# Patient Record
Sex: MALE | Race: WHITE | NOT HISPANIC OR LATINO | URBAN - METROPOLITAN AREA
[De-identification: names, ages, dates, MRNs, and addresses within clinical notes are randomized per-mention and may not be internally consistent; named-entity substitution may affect disease eponyms.]

---

## 2020-07-25 ENCOUNTER — TELEPHONE ENCOUNTER (OUTPATIENT)
Dept: URBAN - METROPOLITAN AREA CLINIC 13 | Facility: CLINIC | Age: 65
End: 2020-07-25

## 2020-07-25 RX ORDER — GABAPENTIN 300 MG/1
CAPSULE ORAL
Refills: 0 | OUTPATIENT
Start: 2019-04-29 | End: 2019-08-21

## 2020-07-25 RX ORDER — HYDROXYCHLOROQUINE SULFATE 200 MG/1
TABLET ORAL
Refills: 0 | OUTPATIENT
End: 2019-10-11

## 2020-07-25 RX ORDER — METHYLPREDNISOLONE 4 MG/1
TABLET ORAL
Qty: 21 | Refills: 0 | OUTPATIENT
Start: 2019-05-14 | End: 2019-08-21

## 2020-07-25 RX ORDER — POLYETHYLENE GLYCOL 3350, SODIUM CHLORIDE, SODIUM BICARBONATE AND POTASSIUM CHLORIDE WITH LEMON FLAVOR 420; 11.2; 5.72; 1.48 G/4L; G/4L; G/4L; G/4L
TAKE AS DIRECTED POWDER, FOR SOLUTION ORAL
Qty: 1 | Refills: 0 | OUTPATIENT
Start: 2019-08-21 | End: 2019-10-11

## 2020-07-25 RX ORDER — POLYETHYLENE GLYCOL 3350, SODIUM CHLORIDE, SODIUM BICARBONATE AND POTASSIUM CHLORIDE WITH LEMON FLAVOR 420; 11.2; 5.72; 1.48 G/4L; G/4L; G/4L; G/4L
TAKE AS DIRECTED POWDER, FOR SOLUTION ORAL
Qty: 1 | Refills: 0 | OUTPATIENT
Start: 2019-08-21 | End: 2019-08-21

## 2020-07-25 RX ORDER — MONTELUKAST SODIUM 10 MG/1
TAKE 1 TABLET DAILY TABLET, FILM COATED ORAL
Refills: 0 | OUTPATIENT
End: 2019-11-11

## 2020-07-26 ENCOUNTER — TELEPHONE ENCOUNTER (OUTPATIENT)
Dept: URBAN - METROPOLITAN AREA CLINIC 13 | Facility: CLINIC | Age: 65
End: 2020-07-26

## 2020-07-26 RX ORDER — PANTOPRAZOLE 20 MG/1
TABLET, DELAYED RELEASE ORAL
Qty: 90 | Refills: 0 | Status: ACTIVE | COMMUNITY
Start: 2019-05-24

## 2020-07-26 RX ORDER — MONTELUKAST SODIUM 10 MG/1
TABLET, FILM COATED ORAL
Qty: 30 | Refills: 0 | Status: ACTIVE | COMMUNITY
Start: 2019-09-19

## 2020-07-26 RX ORDER — METHSCOPOLAMINE BROMIDE 2.5 MG/1
TABLET ORAL
Qty: 60 | Refills: 0 | Status: ACTIVE | COMMUNITY
Start: 2019-11-04

## 2020-07-26 RX ORDER — HYDROXYCHLOROQUINE SULFATE 200 MG/1
TAKE 2 TABLETS DAILY WITH FOOD TABLET, FILM COATED ORAL
Refills: 0 | Status: ACTIVE | COMMUNITY
Start: 2019-04-11

## 2020-07-26 RX ORDER — OXYCODONE AND ACETAMINOPHEN 5; 325 MG/1; MG/1
TABLET ORAL
Qty: 20 | Refills: 0 | Status: ACTIVE | COMMUNITY
Start: 2019-05-08

## 2020-07-26 RX ORDER — PANTOPRAZOLE SODIUM 40 MG/1
TAKE 1 TABLET BY MOUTH EVERY DAY TABLET, DELAYED RELEASE ORAL
Qty: 30 | Refills: 7 | Status: ACTIVE | COMMUNITY
Start: 2019-08-21

## 2020-07-26 RX ORDER — METHOTREXATE 25 MG/ML
INJECTION INTRA-ARTERIAL; INTRAMUSCULAR; INTRATHECAL; INTRAVENOUS
Qty: 2 | Refills: 0 | Status: ACTIVE | COMMUNITY
Start: 2019-04-17

## 2020-07-26 RX ORDER — TRAMADOL HYDROCHLORIDE 50 MG/1
TAKE 1 TABLET BY MOUTH EVERY 8 HOURS AS NEEDED FOR PAIN TABLET ORAL
Qty: 60 | Refills: 0 | Status: ACTIVE | COMMUNITY
Start: 2018-11-12

## 2020-07-26 RX ORDER — PANTOPRAZOLE 20 MG/1
TABLET, DELAYED RELEASE ORAL
Qty: 90 | Refills: 0 | Status: ACTIVE | COMMUNITY
Start: 2019-02-26

## 2020-07-26 RX ORDER — METHOTREXATE 2.5 MG/1
TAKE 1 TABLET WEEKLY INJECTION INTO ABDOMEN AT HOME AS INSTRUCTED TABLET ORAL
Refills: 0 | Status: ACTIVE | COMMUNITY

## 2020-07-26 RX ORDER — METHOTREXATE 25 MG/ML
1 ML ONCE WEEKLY INJECTION INJECTION INTRA-ARTERIAL; INTRAMUSCULAR; INTRATHECAL; INTRAVENOUS
Qty: 12 | Refills: 0 | Status: ACTIVE | COMMUNITY
Start: 2019-09-10

## 2020-07-26 RX ORDER — METHOTREXATE 25 MG/ML
INJECTION INTRA-ARTERIAL; INTRAMUSCULAR; INTRATHECAL; INTRAVENOUS
Qty: 12 | Refills: 0 | Status: ACTIVE | COMMUNITY
Start: 2019-08-02

## 2020-07-26 RX ORDER — TIZANIDINE 2 MG/1
TAKE 1 TABLET BY MOUTH EVERY 8HOURS AS NEEDED FOR BACK PAIN TABLET ORAL
Qty: 30 | Refills: 0 | Status: ACTIVE | COMMUNITY
Start: 2018-11-12

## 2020-10-29 ENCOUNTER — ERX REFILL RESPONSE (OUTPATIENT)
Age: 65
End: 2020-10-29

## 2020-10-29 RX ORDER — PANTOPRAZOLE SODIUM 40 MG/1
TAKE 1 TABLET BY MOUTH EVERY DAY TABLET, DELAYED RELEASE ORAL
Qty: 30 | Refills: 1

## 2020-10-30 ENCOUNTER — ERX REFILL RESPONSE (OUTPATIENT)
Age: 65
End: 2020-10-30

## 2020-10-30 RX ORDER — PANTOPRAZOLE SODIUM 40 MG/1
TAKE 1 TABLET BY MOUTH EVERY DAY TABLET, DELAYED RELEASE ORAL ONCE A DAY
Qty: 30 | Refills: 2

## 2021-03-07 ENCOUNTER — ERX REFILL RESPONSE (OUTPATIENT)
Dept: URBAN - METROPOLITAN AREA CLINIC 113 | Facility: CLINIC | Age: 66
End: 2021-03-07

## 2021-03-07 RX ORDER — PANTOPRAZOLE SODIUM 40 MG/1
TAKE 1 TABLET BY MOUTH EVERY DAY TABLET, DELAYED RELEASE ORAL ONCE A DAY
Qty: 30 | Refills: 2

## 2021-04-20 ENCOUNTER — OFFICE VISIT (OUTPATIENT)
Dept: URBAN - METROPOLITAN AREA CLINIC 113 | Facility: CLINIC | Age: 66
End: 2021-04-20
Payer: MEDICARE

## 2021-04-20 ENCOUNTER — WEB ENCOUNTER (OUTPATIENT)
Dept: URBAN - METROPOLITAN AREA CLINIC 113 | Facility: CLINIC | Age: 66
End: 2021-04-20

## 2021-04-20 VITALS
HEIGHT: 72 IN | WEIGHT: 290 LBS | TEMPERATURE: 97.7 F | DIASTOLIC BLOOD PRESSURE: 91 MMHG | BODY MASS INDEX: 39.28 KG/M2 | SYSTOLIC BLOOD PRESSURE: 160 MMHG | HEART RATE: 57 BPM | RESPIRATION RATE: 18 BRPM

## 2021-04-20 DIAGNOSIS — K59.03 DRUG-INDUCED CONSTIPATION: ICD-10-CM

## 2021-04-20 DIAGNOSIS — K21.9 GASTROESOPHAGEAL REFLUX DISEASE WITHOUT ESOPHAGITIS: ICD-10-CM

## 2021-04-20 DIAGNOSIS — R19.8 IRREGULAR BOWEL HABITS: ICD-10-CM

## 2021-04-20 PROCEDURE — 99214 OFFICE O/P EST MOD 30 MIN: CPT | Performed by: INTERNAL MEDICINE

## 2021-04-20 RX ORDER — PANTOPRAZOLE SODIUM 40 MG/1
TAKE 1 TABLET BY MOUTH EVERY DAY TABLET, DELAYED RELEASE ORAL ONCE A DAY
Qty: 30 | Refills: 2 | Status: ACTIVE | COMMUNITY

## 2021-04-20 RX ORDER — METHOTREXATE 25 MG/ML
INJECTION INTRA-ARTERIAL; INTRAMUSCULAR; INTRATHECAL; INTRAVENOUS
Qty: 2 | Refills: 0 | Status: ACTIVE | COMMUNITY
Start: 2019-04-17

## 2021-04-20 RX ORDER — METHSCOPOLAMINE BROMIDE 2.5 MG/1
1 TABLET 30 MINUTES BEFORE MEALS AND AT BEDTIME TABLET ORAL
Refills: 0 | Status: DISCONTINUED | COMMUNITY
Start: 2019-11-04

## 2021-04-20 RX ORDER — CELECOXIB 200 MG/1
1 CAPSULE WITH FOOD CAPSULE ORAL ONCE A DAY
Status: ACTIVE | COMMUNITY

## 2021-04-20 RX ORDER — METHYLNALTREXONE BROMIDE 150 MG/1
3 TABLETS 30 MINUTES BEFORE THE FIRST MEAL OF THE DAY TABLET ORAL ONCE A DAY
Qty: 90 | Refills: 3 | OUTPATIENT
Start: 2021-04-21 | End: 2021-08-19

## 2021-04-20 RX ORDER — MONTELUKAST SODIUM 10 MG/1
1 TABLET TABLET, FILM COATED ORAL ONCE A DAY
Refills: 0 | Status: DISCONTINUED | COMMUNITY
Start: 2019-09-19

## 2021-04-20 RX ORDER — OXYCODONE AND ACETAMINOPHEN 5; 325 MG/1; MG/1
1 TABLET AS NEEDED TABLET ORAL
Refills: 0 | Status: DISCONTINUED | COMMUNITY
Start: 2019-05-08

## 2021-04-20 RX ORDER — METHOTREXATE 2.5 MG/1
TAKE 1 TABLET WEEKLY INJECTION INTO ABDOMEN AT HOME AS INSTRUCTED TABLET ORAL
Refills: 0 | Status: DISCONTINUED | COMMUNITY

## 2021-04-20 RX ORDER — PANTOPRAZOLE 20 MG/1
TABLET, DELAYED RELEASE ORAL
Qty: 90 | Refills: 0 | Status: DISCONTINUED | COMMUNITY
Start: 2019-02-26

## 2021-04-20 RX ORDER — TRAMADOL HYDROCHLORIDE 50 MG/1
TAKE 1 TABLET BY MOUTH EVERY 8 HOURS AS NEEDED FOR PAIN TABLET ORAL
Qty: 60 | Refills: 0 | Status: ACTIVE | COMMUNITY
Start: 2018-11-12

## 2021-04-20 RX ORDER — TIZANIDINE 2 MG/1
TAKE 1 TABLET BY MOUTH EVERY 8HOURS AS NEEDED FOR BACK PAIN TABLET ORAL
Qty: 30 | Refills: 0 | Status: DISCONTINUED | COMMUNITY
Start: 2018-11-12

## 2021-04-20 RX ORDER — HYDROXYCHLOROQUINE SULFATE 200 MG/1
TAKE 2 TABLETS DAILY WITH FOOD TABLET, FILM COATED ORAL
Refills: 0 | Status: ACTIVE | COMMUNITY
Start: 2019-04-11

## 2021-04-20 RX ORDER — PLECANATIDE 3 MG/1
1 TABLET TABLET ORAL ONCE A DAY
Qty: 30 | Refills: 3 | OUTPATIENT
Start: 2021-04-21 | End: 2021-08-19

## 2021-04-20 NOTE — HPI-TODAY'S VISIT:
Mr. Dorantes is a 65-year-old male with a history of rheumatoid arthritis on Actonel and methotrexate, back pain, obesity, and GERD presenting for follow-up for complaints of constipation and heartburn. He was last seen for these complaints on November 11, 2019. At that time, he was being followed up for complaints of belching, heartburn, regurgitation, and worsening constipation. He also had symptoms of hoarseness despite taking pantoprazole 40 mg daily. He did admit to a postnasal drip. The patient's pantoprazole was increased to 40 mg daily, and he was placed on Metamucil and MiraLAX one capful daily. The patient was also scheduled for upper endoscopy and colonoscopy. Upper endoscopy was never performed. The patient states that his reflux symptoms improved to the point that he did not feel it was necessary. Colonoscopy was performed on October 11, 2019. He was found to have a fair bowel preparation, left colon diverticulosis, 2 transverse colon polyps, and nonbleeding internal hemorrhoids. The polyp were both tubular adenomas.  At the time of his last visit, he was doing well. Reflux had largely resolved. Bowel habits were normal, with no rectal bleeding, constipation or diarrhea.   The patient's reflux symptoms remain controlled on pantoprazole 40 mg daily.  He is taking Metamucil and MiraLAX for his constipation, but he is continuing to have problems with alternating bowel habits.  He is having a bowel movement every 3 days or so, with passage of a hard stool followed by the passage of multiple increasingly loose stools.  He then will have no bowel movement for 3 days and the cycle will repeat itself.  He is eating a high-fiber diet, and is straining a lot.  He is concerned because he is having problems with abdominal wall hernias, and feels that he cannot have these repaired until he eliminates the straining problem with defecation.  Of note, the patient is taking acetaminophen/oxycodone every 6 hours for complaints of back pain.  This may be contributing to his current symptomatology.  He admits that he is not using fiber as regularly as he should.  He is only taking it sporadically.  He is not using MiraLAX at this time.  Notably, the patient denies rectal bleeding, weight loss, etc.

## 2021-04-20 NOTE — EXAM-FUNCTIONAL ASSESSMENT
General--no acute distress Eyes--anicteric HENT--normocephalic, atraumatic head Neck--no lymphadenopathy Chest--normal breath sounds Heart--regular rate and rhythm Abdomen--soft, non tender, Softly distended, bowel sounds present. Reducible umbilical hernia noted. Musculoskeletal--Somewhat lurching gait and station Skin--no rashes Neurologic--Alert and oriented x 3 Psychiatric--stable mood, appropriate affect

## 2021-04-21 PROBLEM — 266435005: Status: ACTIVE | Noted: 2021-04-21

## 2021-04-22 ENCOUNTER — ERX REFILL RESPONSE (OUTPATIENT)
Dept: URBAN - METROPOLITAN AREA CLINIC 113 | Facility: CLINIC | Age: 66
End: 2021-04-22

## 2021-04-22 RX ORDER — METHYLNALTREXONE BROMIDE 150 MG/1
3 TABLETS 30 MINUTES BEFORE THE FIRST MEAL OF THE DAY TABLET ORAL ONCE A DAY
Qty: 90 | Refills: 3 | OUTPATIENT

## 2021-04-22 RX ORDER — NALOXEGOL OXALATE 25 MG/1
PLEASE SPECIFY DIRECTIONS, REFILLS AND QUANTITY TABLET, FILM COATED ORAL
Qty: 1 TABLET | Refills: 0 | OUTPATIENT

## 2021-08-16 ENCOUNTER — OFFICE VISIT (OUTPATIENT)
Dept: URBAN - METROPOLITAN AREA CLINIC 113 | Facility: CLINIC | Age: 66
End: 2021-08-16

## 2021-08-21 ENCOUNTER — ERX REFILL RESPONSE (OUTPATIENT)
Dept: URBAN - METROPOLITAN AREA CLINIC 113 | Facility: CLINIC | Age: 66
End: 2021-08-21

## 2021-08-21 RX ORDER — PANTOPRAZOLE SODIUM 40 MG/1
TAKE 1 TABLET BY MOUTH EVERY DAY TABLET, DELAYED RELEASE ORAL
Qty: 30 TABLET | Refills: 3 | OUTPATIENT

## 2021-08-21 RX ORDER — PANTOPRAZOLE SODIUM 40 MG/1
TAKE 1 TABLET BY MOUTH EVERY DAY TABLET, DELAYED RELEASE ORAL ONCE A DAY
Qty: 30 | Refills: 2 | OUTPATIENT

## 2024-04-17 ENCOUNTER — LAB (OUTPATIENT)
Dept: URBAN - METROPOLITAN AREA CLINIC 113 | Facility: CLINIC | Age: 69
End: 2024-04-17

## 2024-04-17 ENCOUNTER — OV NP (OUTPATIENT)
Dept: URBAN - METROPOLITAN AREA CLINIC 113 | Facility: CLINIC | Age: 69
End: 2024-04-17
Payer: MEDICARE

## 2024-04-17 VITALS
WEIGHT: 268 LBS | SYSTOLIC BLOOD PRESSURE: 138 MMHG | DIASTOLIC BLOOD PRESSURE: 80 MMHG | BODY MASS INDEX: 36.3 KG/M2 | HEART RATE: 57 BPM | TEMPERATURE: 97.7 F | HEIGHT: 72 IN | RESPIRATION RATE: 20 BRPM

## 2024-04-17 DIAGNOSIS — K59.03 DRUG-INDUCED CONSTIPATION: ICD-10-CM

## 2024-04-17 DIAGNOSIS — K21.9 GERD WITHOUT ESOPHAGITIS: ICD-10-CM

## 2024-04-17 DIAGNOSIS — Z86.010 HISTORY OF ADENOMATOUS POLYP OF COLON: ICD-10-CM

## 2024-04-17 PROBLEM — 266435005: Status: ACTIVE | Noted: 2024-04-17

## 2024-04-17 PROCEDURE — 99214 OFFICE O/P EST MOD 30 MIN: CPT | Performed by: NURSE PRACTITIONER

## 2024-04-17 RX ORDER — HYDROXYCHLOROQUINE SULFATE 200 MG/1
TAKE 2 TABLETS DAILY WITH FOOD TABLET, FILM COATED ORAL
Refills: 0 | Status: ACTIVE | COMMUNITY
Start: 2019-04-11

## 2024-04-17 RX ORDER — NALOXEGOL OXALATE 25 MG/1
PLEASE SPECIFY DIRECTIONS, REFILLS AND QUANTITY TABLET, FILM COATED ORAL
Qty: 1 TABLET | Refills: 0 | Status: ON HOLD | COMMUNITY

## 2024-04-17 RX ORDER — PANTOPRAZOLE SODIUM 40 MG/1
TAKE 1 TABLET BY MOUTH EVERY DAY TABLET, DELAYED RELEASE ORAL
Qty: 30 TABLET | Refills: 3 | Status: ACTIVE | COMMUNITY

## 2024-04-17 RX ORDER — CELECOXIB 200 MG/1
1 CAPSULE WITH FOOD CAPSULE ORAL ONCE A DAY
Status: ON HOLD | COMMUNITY

## 2024-04-17 RX ORDER — PANTOPRAZOLE SODIUM 40 MG/1
1 TABLET TABLET, DELAYED RELEASE ORAL TWICE DAILY
Qty: 60 | Refills: 3 | OUTPATIENT
Start: 2024-04-17

## 2024-04-17 RX ORDER — METHOTREXATE 25 MG/ML
INJECTION INTRA-ARTERIAL; INTRAMUSCULAR; INTRATHECAL; INTRAVENOUS
Qty: 2 | Refills: 0 | Status: ACTIVE | COMMUNITY
Start: 2019-04-17

## 2024-04-17 RX ORDER — TRAMADOL HYDROCHLORIDE 50 MG/1
TAKE 1 TABLET BY MOUTH EVERY 8 HOURS AS NEEDED FOR PAIN TABLET ORAL
Qty: 60 | Refills: 0 | Status: ACTIVE | COMMUNITY
Start: 2018-11-12

## 2024-04-17 NOTE — HPI-TODAY'S VISIT:
This is is a 68-year-old male with a history of rheumatoid arthritis on Actonel and methotrexate, chronic back pain, irregular bowel habits/drug-induced constipation (opioids), GERD, and adenomatous colon polyps due surveillance in October 2024 presenting for follow-up.  He was last seen in the office 4/20/2021.  He had persistent irregular bowel habits affecting his quality of life.  He had been inconsistently using fiber supplements and osmotic laxatives.  He was encouraged to take these on a regular basis.  He was prescribed a trial of Relistor and Trulance.  He was to begin Relistor and use Trulance if Relistor was ineffective.  He did not return for follow-up.  In November, he had some exacerbations of back pain.  Except for trips to the bathroom, he was immobile for 2 months.  He had a subsequent MRI that discovered a left renal cancer.  He underwent left nephrectomy.  He reports increase in size of abdominal hernia postoperatively.  He had postoperative regurgitation following renal surgery.  He reports burning indicated in the epigastrium and chest with belching associated with a "chemical odor."  He has experienced burning from the umbilicus into his epigastrium and underneath his right and left lower anterior ribs.  He reports a raspy throat.  He feels as though his esophagus is restricted associated with episodes of reflux.  He reports a sensation of fullness in his esophagus and food "moves a little slower."  He denies food becoming lodged. He has persistent constipation.  Insurance would not cover Relistor and Trulance had a $200 co-pay.  He is currently "sipping" mineral oil twice a day as needed.  He currently has 2 days without a bowel movement, strains for stool, then has normal bowel movements and then the cycle will repeat itself.  He is "okay" with his current bowel habits.  He denies NSAID use.

## 2024-04-19 ENCOUNTER — EGD (OUTPATIENT)
Dept: URBAN - METROPOLITAN AREA SURGERY CENTER 25 | Facility: SURGERY CENTER | Age: 69
End: 2024-04-19
Payer: MEDICARE

## 2024-04-19 ENCOUNTER — LAB (OUTPATIENT)
Dept: URBAN - METROPOLITAN AREA CLINIC 4 | Facility: CLINIC | Age: 69
End: 2024-04-19
Payer: MEDICARE

## 2024-04-19 DIAGNOSIS — K22.89 OTHER SPECIFIED DISEASE OF ESOPHAGUS: ICD-10-CM

## 2024-04-19 DIAGNOSIS — R10.13 DYSPEPSIA: ICD-10-CM

## 2024-04-19 DIAGNOSIS — K31.7 BENIGN GASTRIC POLYP: ICD-10-CM

## 2024-04-19 DIAGNOSIS — K31.7 POLYP OF STOMACH AND DUODENUM: ICD-10-CM

## 2024-04-19 DIAGNOSIS — K21.9 GASTRO-ESOPHAGEAL REFLUX DISEASE WITHOUT ESOPHAGITIS: ICD-10-CM

## 2024-04-19 PROCEDURE — 43239 EGD BIOPSY SINGLE/MULTIPLE: CPT | Performed by: INTERNAL MEDICINE

## 2024-04-19 PROCEDURE — 88312 SPECIAL STAINS GROUP 1: CPT | Performed by: PATHOLOGY

## 2024-04-19 PROCEDURE — 88305 TISSUE EXAM BY PATHOLOGIST: CPT | Performed by: PATHOLOGY

## 2024-04-19 RX ORDER — PANTOPRAZOLE SODIUM 40 MG/1
1 TABLET TABLET, DELAYED RELEASE ORAL TWICE DAILY
Qty: 60 | Refills: 3 | Status: ACTIVE | COMMUNITY
Start: 2024-04-17

## 2024-04-19 RX ORDER — PANTOPRAZOLE SODIUM 40 MG/1
TAKE 1 TABLET BY MOUTH EVERY DAY TABLET, DELAYED RELEASE ORAL
Qty: 30 TABLET | Refills: 3 | Status: ACTIVE | COMMUNITY

## 2024-04-19 RX ORDER — METHOTREXATE 25 MG/ML
INJECTION INTRA-ARTERIAL; INTRAMUSCULAR; INTRATHECAL; INTRAVENOUS
Qty: 2 | Refills: 0 | Status: ACTIVE | COMMUNITY
Start: 2019-04-17

## 2024-04-19 RX ORDER — NALOXEGOL OXALATE 25 MG/1
PLEASE SPECIFY DIRECTIONS, REFILLS AND QUANTITY TABLET, FILM COATED ORAL
Qty: 1 TABLET | Refills: 0 | Status: ON HOLD | COMMUNITY

## 2024-04-19 RX ORDER — HYDROXYCHLOROQUINE SULFATE 200 MG/1
TAKE 2 TABLETS DAILY WITH FOOD TABLET, FILM COATED ORAL
Refills: 0 | Status: ACTIVE | COMMUNITY
Start: 2019-04-11

## 2024-04-19 RX ORDER — TRAMADOL HYDROCHLORIDE 50 MG/1
TAKE 1 TABLET BY MOUTH EVERY 8 HOURS AS NEEDED FOR PAIN TABLET ORAL
Qty: 60 | Refills: 0 | Status: ACTIVE | COMMUNITY
Start: 2018-11-12

## 2024-04-19 RX ORDER — CELECOXIB 200 MG/1
1 CAPSULE WITH FOOD CAPSULE ORAL ONCE A DAY
Status: ON HOLD | COMMUNITY

## 2024-04-20 PROBLEM — 429047008: Status: ACTIVE | Noted: 2024-04-20

## 2024-07-18 ENCOUNTER — OFFICE VISIT (OUTPATIENT)
Dept: URBAN - METROPOLITAN AREA CLINIC 113 | Facility: CLINIC | Age: 69
End: 2024-07-18
Payer: MEDICARE

## 2024-07-18 ENCOUNTER — LAB OUTSIDE AN ENCOUNTER (OUTPATIENT)
Dept: URBAN - METROPOLITAN AREA CLINIC 113 | Facility: CLINIC | Age: 69
End: 2024-07-18

## 2024-07-18 ENCOUNTER — DASHBOARD ENCOUNTERS (OUTPATIENT)
Age: 69
End: 2024-07-18

## 2024-07-18 VITALS
RESPIRATION RATE: 20 BRPM | TEMPERATURE: 97.5 F | HEIGHT: 72 IN | SYSTOLIC BLOOD PRESSURE: 138 MMHG | WEIGHT: 282 LBS | DIASTOLIC BLOOD PRESSURE: 84 MMHG | BODY MASS INDEX: 38.19 KG/M2 | HEART RATE: 59 BPM

## 2024-07-18 DIAGNOSIS — Z86.010 HISTORY OF ADENOMATOUS POLYP OF COLON: ICD-10-CM

## 2024-07-18 DIAGNOSIS — K21.9 GERD WITHOUT ESOPHAGITIS: ICD-10-CM

## 2024-07-18 DIAGNOSIS — R14.3 EXCESSIVE GAS: ICD-10-CM

## 2024-07-18 DIAGNOSIS — R19.4 ALTERATION IN BOWEL ELIMINATION: ICD-10-CM

## 2024-07-18 PROBLEM — 80301007: Status: ACTIVE | Noted: 2024-07-18

## 2024-07-18 PROCEDURE — 99214 OFFICE O/P EST MOD 30 MIN: CPT | Performed by: NURSE PRACTITIONER

## 2024-07-18 RX ORDER — NALOXEGOL OXALATE 25 MG/1
PLEASE SPECIFY DIRECTIONS, REFILLS AND QUANTITY TABLET, FILM COATED ORAL
Qty: 1 TABLET | Refills: 0 | Status: ON HOLD | COMMUNITY

## 2024-07-18 RX ORDER — PANTOPRAZOLE SODIUM 40 MG/1
1 TABLET TABLET, DELAYED RELEASE ORAL TWICE DAILY
Qty: 60 | Refills: 3 | Status: ACTIVE | COMMUNITY
Start: 2024-04-17

## 2024-07-18 RX ORDER — METHOTREXATE 25 MG/ML
? STRENGTH; 1ML INJECTION, SOLUTION INTRA-ARTERIAL; INTRAMUSCULAR; INTRAVENOUS WEEKLY
Status: ACTIVE | COMMUNITY

## 2024-07-18 RX ORDER — PANTOPRAZOLE SODIUM 40 MG/1
1 TABLET TABLET, DELAYED RELEASE ORAL TWICE DAILY
Qty: 180 | Refills: 3 | Status: ON HOLD | COMMUNITY

## 2024-07-18 RX ORDER — FOLIC ACID 1 MG/1
1 TABLET TABLET ORAL ONCE A DAY
Status: ACTIVE | COMMUNITY

## 2024-07-18 RX ORDER — POLYETHYLENE GLYCOL 3350, SODIUM CHLORIDE, SODIUM BICARBONATE, POTASSIUM CHLORIDE 420; 11.2; 5.72; 1.48 G/4L; G/4L; G/4L; G/4L
AS DIRECTED POWDER, FOR SOLUTION ORAL
Qty: 1 BOTTLE | Refills: 0 | OUTPATIENT
Start: 2024-07-18 | End: 2024-07-19

## 2024-07-18 RX ORDER — HYDROXYCHLOROQUINE SULFATE 200 MG/1
TAKE 2 TABLETS DAILY WITH FOOD TABLET, FILM COATED ORAL
Refills: 0 | Status: ACTIVE | COMMUNITY
Start: 2019-04-11

## 2024-07-18 RX ORDER — CELECOXIB 200 MG/1
1 CAPSULE WITH FOOD CAPSULE ORAL ONCE A DAY
Status: ON HOLD | COMMUNITY

## 2024-07-18 RX ORDER — TRAMADOL HYDROCHLORIDE 50 MG/1
TAKE 1 TABLET BY MOUTH EVERY 8 HOURS AS NEEDED FOR PAIN TABLET ORAL
Qty: 60 | Refills: 0 | Status: ACTIVE | COMMUNITY
Start: 2018-11-12

## 2024-07-18 RX ORDER — METRONIDAZOLE 500 MG/1
1 TABLET TABLET ORAL THREE TIMES A DAY
Qty: 30 TABLET | Refills: 0 | OUTPATIENT
Start: 2024-07-18 | End: 2024-07-28

## 2024-07-18 RX ORDER — ESOMEPRAZOLE MAGNESIUM 40 MG/1
1 CAPSULE CAPSULE, DELAYED RELEASE PELLETS ORAL ONCE A DAY
Qty: 30 CAPSULE | Refills: 11 | OUTPATIENT
Start: 2024-07-18

## 2024-07-18 NOTE — HPI-OTHER HISTORIES
EGD 4/19/2024: Irregular Z-line status post biopsy, multiple gastric polyps resected and retrieved, normal duodenum. Pathology: Stomach body biopsies demonstrated fundic gland polyps without H. pylori or intestinal metaplasia. Distal esophageal biopsies demonstrated gastric mucosa without significant abnormality; no squamous mucosa identified.

## 2024-07-18 NOTE — HPI-TODAY'S VISIT:
This is a 68-year-old male with a history of rheumatoid arthritis on Actonel and methotrexate, chronic back pain, irregular bowel habits/drug-induced constipation (opioids), GERD, and adenomatous colon polyps due surveillance in October 2024 presenting for follow-up.  He was last seen in the office 4/17/2024 reporting worsening acid reflux on pantoprazole 40 mg daily.  He was to increase to twice a day and was scheduled for an EGD.  He was managing constipation with mineral oil twice a day and was to continue this.  EGD 4/19/2024: Irregular Z-line status post biopsy, multiple gastric polyps resected and retrieved, normal duodenum. Pathology: Stomach body biopsies demonstrated fundic gland polyps without H. pylori or intestinal metaplasia.  Distal esophageal biopsies demonstrated gastric mucosa without significant abnormality; no squamous mucosa identified.  He is taking pantoprazole 40 mg twice a day.  He continues to have indigestion described as heartburn and regurgitation.  This may occur after eating or 2 or 3 hours later.  He denies dysphagia.  He took Nexium in the past and reports it was immediately effective.  He has constant pain in the area of an umbilical hernia.  He is scheduled for a hernia repair with Dr. Izquierdo next month.  He also has chronic pain in the area of prior renal surgery.  His bowel habits are stable on a Metamucil wafer daily and mineral oil twice a day.  He will have 1 or 2 days without a bowel movement and then will have a single bowel movement for 2 days and then a day during which she has 3 stools.  The third bowel movement may be very soft or loose.  Afterward, the cycle will repeat itself.  This is not concerning.  He is complaining of worsening gas and bloating.  He is taking a probiotic.  He recently tried align prebiotic's and had worsening symptoms after 2 weeks.  He denies any other abdominal symptoms.

## 2024-10-11 ENCOUNTER — OFFICE VISIT (OUTPATIENT)
Dept: URBAN - METROPOLITAN AREA SURGERY CENTER 25 | Facility: SURGERY CENTER | Age: 69
End: 2024-10-11
Payer: MEDICARE

## 2024-10-11 ENCOUNTER — CLAIMS CREATED FROM THE CLAIM WINDOW (OUTPATIENT)
Dept: URBAN - METROPOLITAN AREA CLINIC 4 | Facility: CLINIC | Age: 69
End: 2024-10-11
Payer: MEDICARE

## 2024-10-11 DIAGNOSIS — Z09 ENCNTR FOR F/U EXAM AFT TRTMT FOR COND OTH THAN MALIG NEOPLM: ICD-10-CM

## 2024-10-11 DIAGNOSIS — K63.5 BENIGN COLON POLYPS: ICD-10-CM

## 2024-10-11 DIAGNOSIS — K57.30 COLON, DIVERTICULOSIS: ICD-10-CM

## 2024-10-11 DIAGNOSIS — Z86.0101 HISTORY OF ADENOMATOUS POLYP OF COLON: ICD-10-CM

## 2024-10-11 DIAGNOSIS — D12.2 BENIGN NEOPLASM OF ASCENDING COLON: ICD-10-CM

## 2024-10-11 DIAGNOSIS — Z86.0100 HISTORY OF COLON POLYPS: ICD-10-CM

## 2024-10-11 DIAGNOSIS — D12.2 ADENOMA OF ASCENDING COLON: ICD-10-CM

## 2024-10-11 DIAGNOSIS — Z86.0100 PERSONAL HISTORY OF COLON POLYPS, UNSPECIFIED: ICD-10-CM

## 2024-10-11 PROCEDURE — 00811 ANES LWR INTST NDSC NOS: CPT | Performed by: ANESTHESIOLOGY

## 2024-10-11 PROCEDURE — 0529F INTRVL 3/>YR PTS CLNSCP DOCD: CPT | Performed by: INTERNAL MEDICINE

## 2024-10-11 PROCEDURE — 45385 COLONOSCOPY W/LESION REMOVAL: CPT | Performed by: CLINIC/CENTER

## 2024-10-11 PROCEDURE — 45385 COLONOSCOPY W/LESION REMOVAL: CPT | Performed by: INTERNAL MEDICINE

## 2024-10-11 PROCEDURE — 00811 ANES LWR INTST NDSC NOS: CPT | Performed by: ANESTHESIOLOGIST ASSISTANT

## 2024-10-11 PROCEDURE — 88305 TISSUE EXAM BY PATHOLOGIST: CPT | Performed by: PATHOLOGY

## 2024-10-11 PROCEDURE — 0529F INTRVL 3/>YR PTS CLNSCP DOCD: CPT | Performed by: CLINIC/CENTER

## 2024-10-11 RX ORDER — PANTOPRAZOLE SODIUM 40 MG/1
1 TABLET TABLET, DELAYED RELEASE ORAL TWICE DAILY
Qty: 180 | Refills: 3 | Status: ON HOLD | COMMUNITY

## 2024-10-11 RX ORDER — TRAMADOL HYDROCHLORIDE 50 MG/1
TAKE 1 TABLET BY MOUTH EVERY 8 HOURS AS NEEDED FOR PAIN TABLET, FILM COATED ORAL
Qty: 60 | Refills: 0 | Status: ACTIVE | COMMUNITY
Start: 2018-11-12

## 2024-10-11 RX ORDER — NALOXEGOL OXALATE 25 MG/1
PLEASE SPECIFY DIRECTIONS, REFILLS AND QUANTITY TABLET, FILM COATED ORAL
Qty: 1 TABLET | Refills: 0 | Status: ON HOLD | COMMUNITY

## 2024-10-11 RX ORDER — HYDROXYCHLOROQUINE SULFATE 200 MG/1
TAKE 2 TABLETS DAILY WITH FOOD TABLET, FILM COATED ORAL
Refills: 0 | Status: ACTIVE | COMMUNITY
Start: 2019-04-11

## 2024-10-11 RX ORDER — METHOTREXATE 25 MG/ML
? STRENGTH; 1ML INJECTION, SOLUTION INTRA-ARTERIAL; INTRAMUSCULAR; INTRAVENOUS WEEKLY
Status: ACTIVE | COMMUNITY

## 2024-10-11 RX ORDER — PANTOPRAZOLE SODIUM 40 MG/1
1 TABLET TABLET, DELAYED RELEASE ORAL TWICE DAILY
Qty: 60 | Refills: 3 | Status: ACTIVE | COMMUNITY
Start: 2024-04-17

## 2024-10-11 RX ORDER — CELECOXIB 200 MG/1
1 CAPSULE WITH FOOD CAPSULE ORAL ONCE A DAY
Status: ON HOLD | COMMUNITY

## 2024-10-11 RX ORDER — ESOMEPRAZOLE MAGNESIUM 40 MG/1
1 CAPSULE CAPSULE, DELAYED RELEASE PELLETS ORAL ONCE A DAY
Qty: 30 CAPSULE | Refills: 11 | Status: ACTIVE | COMMUNITY
Start: 2024-07-18

## 2024-10-11 RX ORDER — FOLIC ACID 1 MG/1
1 TABLET TABLET ORAL ONCE A DAY
Status: ACTIVE | COMMUNITY

## 2024-11-04 ENCOUNTER — OFFICE VISIT (OUTPATIENT)
Dept: URBAN - METROPOLITAN AREA CLINIC 113 | Facility: CLINIC | Age: 69
End: 2024-11-04
Payer: MEDICARE

## 2024-11-04 VITALS
TEMPERATURE: 97.5 F | HEIGHT: 72 IN | SYSTOLIC BLOOD PRESSURE: 141 MMHG | DIASTOLIC BLOOD PRESSURE: 80 MMHG | WEIGHT: 258.2 LBS | RESPIRATION RATE: 20 BRPM | HEART RATE: 65 BPM | BODY MASS INDEX: 34.97 KG/M2

## 2024-11-04 DIAGNOSIS — Z86.0101 HISTORY OF ADENOMATOUS POLYP OF COLON: ICD-10-CM

## 2024-11-04 DIAGNOSIS — Z09 ENCOUNTER FOR FOLLOW-UP: ICD-10-CM

## 2024-11-04 DIAGNOSIS — K21.9 GERD WITHOUT ESOPHAGITIS: ICD-10-CM

## 2024-11-04 DIAGNOSIS — R14.3 EXCESSIVE GAS: ICD-10-CM

## 2024-11-04 DIAGNOSIS — R19.8 IRREGULAR BOWEL HABITS: ICD-10-CM

## 2024-11-04 PROBLEM — 444702007: Status: ACTIVE | Noted: 2024-11-04

## 2024-11-04 PROBLEM — 429047008: Status: ACTIVE | Noted: 2024-11-04

## 2024-11-04 PROCEDURE — 99213 OFFICE O/P EST LOW 20 MIN: CPT | Performed by: NURSE PRACTITIONER

## 2024-11-04 RX ORDER — HYDROXYCHLOROQUINE SULFATE 200 MG/1
TAKE 2 TABLETS DAILY WITH FOOD TABLET, FILM COATED ORAL
Refills: 0 | Status: ACTIVE | COMMUNITY
Start: 2019-04-11

## 2024-11-04 RX ORDER — ESOMEPRAZOLE MAGNESIUM 40 MG/1
1 CAPSULE CAPSULE, DELAYED RELEASE PELLETS ORAL ONCE A DAY
Qty: 30 CAPSULE | Refills: 11 | Status: ACTIVE | COMMUNITY
Start: 2024-07-18

## 2024-11-04 RX ORDER — FOLIC ACID 1 MG/1
1 TABLET TABLET ORAL ONCE A DAY
Status: ACTIVE | COMMUNITY

## 2024-11-04 RX ORDER — PANTOPRAZOLE SODIUM 40 MG/1
1 TABLET TABLET, DELAYED RELEASE ORAL TWICE DAILY
Qty: 180 | Refills: 3 | Status: ON HOLD | COMMUNITY

## 2024-11-04 RX ORDER — METHOTREXATE 25 MG/ML
? STRENGTH; 1ML INJECTION, SOLUTION INTRA-ARTERIAL; INTRAMUSCULAR; INTRAVENOUS WEEKLY
Status: ACTIVE | COMMUNITY

## 2024-11-04 RX ORDER — PANTOPRAZOLE SODIUM 40 MG/1
1 TABLET TABLET, DELAYED RELEASE ORAL TWICE DAILY
Qty: 60 | Refills: 3 | Status: ON HOLD | COMMUNITY
Start: 2024-04-17

## 2024-11-04 RX ORDER — CELECOXIB 200 MG/1
1 CAPSULE WITH FOOD CAPSULE ORAL ONCE A DAY
Status: ON HOLD | COMMUNITY

## 2024-11-04 RX ORDER — TRAMADOL HYDROCHLORIDE 50 MG/1
TAKE 1 TABLET BY MOUTH EVERY 8 HOURS AS NEEDED FOR PAIN TABLET, FILM COATED ORAL
Qty: 60 | Refills: 0 | Status: ACTIVE | COMMUNITY
Start: 2018-11-12

## 2024-11-04 RX ORDER — ESOMEPRAZOLE MAGNESIUM 40 MG/1
1 CAPSULE CAPSULE, DELAYED RELEASE PELLETS ORAL TWICE A DAY
Qty: 180 CAPSULE | Refills: 3 | OUTPATIENT
Start: 2024-11-04

## 2024-11-04 RX ORDER — NALOXEGOL OXALATE 25 MG/1
PLEASE SPECIFY DIRECTIONS, REFILLS AND QUANTITY TABLET, FILM COATED ORAL
Qty: 1 TABLET | Refills: 0 | Status: ON HOLD | COMMUNITY

## 2024-11-04 NOTE — HPI-TODAY'S VISIT:
This is a 68-year-old male with a history of rheumatoid arthritis on Actonel and methotrexate, chronic back pain, irregular bowel habits/drug-induced constipation (opioids), GERD, and adenomatous colon polyps due surveillance in 2029 presenting for follow-up.  He was last seen 7/18/2024.  He had undergone a recent EGD that was unremarkable.  He had persistent, daily symptoms on pantoprazole 40 mg twice a day.  In the past, he had benefited significantly from Nexium.  It was thought there may be issues with insurance coverage.  He was prescribed esomeprazole 40 mg daily.  He was managing irregular bowel habits with mineral oil twice a day and Metamucil wafers daily.  He complained of excessive gas.  He was prescribed metronidazole for empiric treatment of small intestinal bacterial overgrowth.  He was provided information regarding lifestyle modification to reduce your swallowing and reducing foods high in fermentable sugars.  He was to [facility] scheduled for colonoscopy for adenoma surveillance consider IBgard twice a day.  Colonoscopy 10/11/2024:Fair prep, removal of 2 ascending 4 to 6 mm sessile polyps, sigmoid and descending diverticulosis, otherwise normal to the terminal ileum.  Pathology: Ascending polyps were tubular adenomas.  Surveillance recommended in 2029.  Since his last visit, he had an umbilical hernia repair with Dr. Izquierdo.  He is taking esomeprazole 40 mg daily.  He reports improvement of symptoms but continues to have breakthrough in the evenings intermittently.  He is asking to increase to twice a day.  He is using antacid tablets as needed which provide some relief.  He continues to have problems with excessive gas.  He feels as though metronidazole was somewhat helpful.  He has tried multiple probiotics without improvement.  He has tried multiple over-the-counter gas medications which have not benefited him.  He tried decreasing some foods that are high in fermentable sugars and this was likewise ineffective.  He did not try IBgard.  He continues to have periods of 2 days during which she does not have a bowel movement and then he will have 3 bowel movements in a single day.  He may have a small volume stool in the morning and later have a second stool with a sensation of incomplete evacuation.  He has tried daily MiraLAX, milk of magnesia, and stimulant laxatives without improvement.  His insurance company will not cover prescription medications for constipation.  He occasionally uses mineral oil.  He is taking Benefiber 3 teaspoons twice a day.  He reports a good response with a bottle of magnesium citrate.

## 2025-05-05 ENCOUNTER — OFFICE VISIT (OUTPATIENT)
Dept: URBAN - METROPOLITAN AREA CLINIC 113 | Facility: CLINIC | Age: 70
End: 2025-05-05
Payer: MEDICARE

## 2025-05-05 DIAGNOSIS — K21.9 GERD WITHOUT ESOPHAGITIS: ICD-10-CM

## 2025-05-05 DIAGNOSIS — K59.03 DRUG-INDUCED CONSTIPATION: ICD-10-CM

## 2025-05-05 PROCEDURE — 99213 OFFICE O/P EST LOW 20 MIN: CPT | Performed by: NURSE PRACTITIONER

## 2025-05-05 RX ORDER — CELECOXIB 200 MG/1
1 CAPSULE WITH FOOD CAPSULE ORAL ONCE A DAY
Status: ON HOLD | COMMUNITY

## 2025-05-05 RX ORDER — TRAMADOL HYDROCHLORIDE 50 MG/1
TAKE 1 TABLET BY MOUTH EVERY 8 HOURS AS NEEDED FOR PAIN TABLET, FILM COATED ORAL
Qty: 60 | Refills: 0 | Status: ACTIVE | COMMUNITY
Start: 2018-11-12

## 2025-05-05 RX ORDER — ESOMEPRAZOLE MAGNESIUM 40 MG/1
1 CAPSULE CAPSULE, DELAYED RELEASE PELLETS ORAL TWICE A DAY
Qty: 180 CAPSULE | Refills: 3 | Status: ACTIVE | COMMUNITY
Start: 2024-11-04

## 2025-05-05 RX ORDER — NALOXEGOL OXALATE 25 MG/1
PLEASE SPECIFY DIRECTIONS, REFILLS AND QUANTITY TABLET, FILM COATED ORAL
Qty: 1 TABLET | Refills: 0 | Status: ON HOLD | COMMUNITY

## 2025-05-05 RX ORDER — ESOMEPRAZOLE MAGNESIUM 40 MG/1
1 CAPSULE CAPSULE, DELAYED RELEASE PELLETS ORAL ONCE A DAY
Qty: 30 CAPSULE | Refills: 11 | Status: ACTIVE | COMMUNITY
Start: 2024-07-18

## 2025-05-05 RX ORDER — METHOTREXATE 25 MG/ML
? STRENGTH; 1ML INJECTION, SOLUTION INTRA-ARTERIAL; INTRAMUSCULAR; INTRAVENOUS WEEKLY
Status: ACTIVE | COMMUNITY

## 2025-05-05 RX ORDER — FOLIC ACID 1 MG/1
1 TABLET TABLET ORAL ONCE A DAY
Status: ACTIVE | COMMUNITY

## 2025-05-05 RX ORDER — PANTOPRAZOLE SODIUM 40 MG/1
1 TABLET TABLET, DELAYED RELEASE ORAL TWICE DAILY
Qty: 180 | Refills: 3 | Status: ON HOLD | COMMUNITY

## 2025-05-05 RX ORDER — PANTOPRAZOLE SODIUM 40 MG/1
1 TABLET TABLET, DELAYED RELEASE ORAL TWICE DAILY
Qty: 60 | Refills: 3 | Status: ON HOLD | COMMUNITY
Start: 2024-04-17

## 2025-05-05 RX ORDER — HYDROXYCHLOROQUINE SULFATE 200 MG/1
TAKE 2 TABLETS DAILY WITH FOOD TABLET, FILM COATED ORAL
Refills: 0 | Status: ACTIVE | COMMUNITY
Start: 2019-04-11

## 2025-05-05 NOTE — HPI-TODAY'S VISIT:
He started having leg cramps after increasing esomeprazole to 40 mg twice a day.  His primary care physician advised him to return to once a day.  He is taking esomeprazole once a day and increases to twice a day as needed.  Cramps have resolved.  He is taking mineral oil and Benefiber every day and is having regular bowel movements.  He denies other abdominal symptoms. He has a total hip replacement planned next week.

## 2025-05-05 NOTE — HPI-OTHER HISTORIES
Colonoscopy 10/11/2024:Fair prep, removal of 2 ascending 4 to 6 mm sessile polyps, sigmoid and descending diverticulosis, otherwise normal to the terminal ileum. Pathology: Ascending polyps were tubular adenomas. Surveillance recommended in 2029.   EGD 4/19/2024: Irregular Z-line status post biopsy, multiple gastric polyps resected and retrieved, normal duodenum. Pathology: Stomach body biopsies demonstrated fundic gland polyps without H. pylori or intestinal metaplasia. Distal esophageal biopsies demonstrated gastric mucosa without significant abnormality; no squamous mucosa identified.

## 2025-05-05 NOTE — HPI-TODAY'S VISIT:
This is a 69-year-old male with a history of rheumatoid arthritis on Actonel and methotrexate, chronic back pain, irregular bowel habits/drug-induced constipation (opioids), GERD, and adenomatous colon polyps due surveillance in 2029 presenting for follow-up.  He was last seen 11/4/2024. Visit discussion: GERD. He reports improvement on esomeprazole 40 mg daily. He continues to have intermittent evening breakthrough symptoms. Will increase esomeprazole to 40 mg twice a day.  Irregular bowel habits, constipation predominant. He is taking Benefiber 3 teaspoons twice a day. He occasionally uses mineral oil. He continues to have problems with constipation. He has not responded to daily stool softeners, MiraLAX, milk of magnesia, or stimulant laxatives. Prescription medications are cost prohibitive. He has responded to magnesium citrate liquid. Recommend he try magnesium citrate tablets or capsules daily (400 to 500 mg per dose). Regarding excessive gas, he reports some benefit while taking metronidazole. Afterward, he had recurrence of symptoms. He has tried over-the-counter gas medication. He has tried decreasing foods high in FODMAPs. These measures have been ineffective. Recommend he try IBgard twice a day and reconsider dietary modification. Hopefully, this will improve as his bowel habits improve.  History of adenomatous colon polyps. 2 adenomas removed during recent colonoscopy. Surveillance recommended in 2029.